# Patient Record
Sex: MALE | Race: ASIAN | Employment: OTHER | URBAN - METROPOLITAN AREA
[De-identification: names, ages, dates, MRNs, and addresses within clinical notes are randomized per-mention and may not be internally consistent; named-entity substitution may affect disease eponyms.]

---

## 2021-02-19 ENCOUNTER — OFFICE VISIT (OUTPATIENT)
Dept: ORTHOPEDICS CLINIC | Facility: CLINIC | Age: 81
End: 2021-02-19
Payer: MEDICARE

## 2021-02-19 DIAGNOSIS — M19.041 PRIMARY OSTEOARTHRITIS OF RIGHT HAND: Primary | ICD-10-CM

## 2021-02-19 PROCEDURE — 20600 DRAIN/INJ JOINT/BURSA W/O US: CPT | Performed by: ORTHOPAEDIC SURGERY

## 2021-02-19 PROCEDURE — 99212 OFFICE O/P EST SF 10 MIN: CPT | Performed by: ORTHOPAEDIC SURGERY

## 2021-02-19 RX ORDER — TRIAMCINOLONE ACETONIDE 40 MG/ML
20 INJECTION, SUSPENSION INTRA-ARTICULAR; INTRAMUSCULAR ONCE
Status: COMPLETED | OUTPATIENT
Start: 2021-02-19 | End: 2021-02-19

## 2021-02-19 RX ORDER — TRIAMCINOLONE ACETONIDE 40 MG/ML
40 INJECTION, SUSPENSION INTRA-ARTICULAR; INTRAMUSCULAR ONCE
Status: DISCONTINUED | OUTPATIENT
Start: 2021-02-19 | End: 2021-02-19

## 2021-02-19 RX ADMIN — TRIAMCINOLONE ACETONIDE 20 MG: 40 INJECTION, SUSPENSION INTRA-ARTICULAR; INTRAMUSCULAR at 13:26:00

## 2021-02-19 RX ADMIN — TRIAMCINOLONE ACETONIDE 20 MG: 40 INJECTION, SUSPENSION INTRA-ARTICULAR; INTRAMUSCULAR at 13:27:00

## 2021-02-19 NOTE — PROGRESS NOTES
Patient is a 14-year-old [place] male here for follow-up on his right thumb. 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   We have done cortisone injections in the past which have been hel

## 2021-07-09 ENCOUNTER — TELEPHONE (OUTPATIENT)
Dept: ORTHOPEDICS CLINIC | Facility: CLINIC | Age: 81
End: 2021-07-09

## 2021-07-09 DIAGNOSIS — M79.674 PAIN OF TOE OF RIGHT FOOT: Primary | ICD-10-CM

## 2021-07-09 NOTE — TELEPHONE ENCOUNTER
Panfilo Crespo., DPM  You 2 minutes ago (2:04 PM)     Ice, heat, ice (20 mins each)   Voltaren gel otc or other otc topicals like bengay, biofreeze ect     Attempted to call Priscilla Cohen regarding the recommendations provided by Dr. Elly Rjoas for pain re

## 2021-07-09 NOTE — TELEPHONE ENCOUNTER
Patient has had pain in his right great toe for the last two weeks. He is having difficulty walking and it is hard to get on a shoe. He would like to be seen today if possible.

## 2021-07-09 NOTE — TELEPHONE ENCOUNTER
Appt set and date/time/location confirmed with patient. Pt asking if there is anything OTC to use for his toe which is achy, pt notified Dr. Karrie Reeder would be asked for her recommendations. Pt verbalized understanding. No further questions at this time.

## 2021-07-09 NOTE — TELEPHONE ENCOUNTER
Attempted to call [facility] regarding scheduling appt next Friday with Dr. Ramesh Sinclair. Reached voicemail, left voicemail and provided a detail message with my call back contact information.

## 2021-07-16 ENCOUNTER — OFFICE VISIT (OUTPATIENT)
Dept: ORTHOPEDICS CLINIC | Facility: CLINIC | Age: 81
End: 2021-07-16
Payer: MEDICARE

## 2021-07-16 ENCOUNTER — HOSPITAL ENCOUNTER (OUTPATIENT)
Dept: GENERAL RADIOLOGY | Facility: HOSPITAL | Age: 81
Discharge: HOME | End: 2021-07-16
Attending: PODIATRIST
Payer: MEDICARE

## 2021-07-16 DIAGNOSIS — M79.674 PAIN OF TOE OF RIGHT FOOT: ICD-10-CM

## 2021-07-16 DIAGNOSIS — L03.031 PARONYCHIA OF TOE OF RIGHT FOOT: Primary | ICD-10-CM

## 2021-07-16 DIAGNOSIS — B35.1 ONYCHOMYCOSIS: ICD-10-CM

## 2021-07-16 PROCEDURE — 99213 OFFICE O/P EST LOW 20 MIN: CPT | Performed by: PODIATRIST

## 2021-07-16 PROCEDURE — 73660 X-RAY EXAM OF TOE(S): CPT | Performed by: PODIATRIST

## 2021-07-16 PROCEDURE — 10060 I&D ABSCESS SIMPLE/SINGLE: CPT | Performed by: PODIATRIST

## 2021-07-16 NOTE — PROGRESS NOTES
[facility] Progress Note    Subjective: Mr. Rich Sim iss a patient I have seen off-and-on for several years for his right foot and specifically his right great toe. He comes in today for follow-up and does obtain a new x-ray today.   Still has had a

## 2021-09-21 ENCOUNTER — OFFICE VISIT (OUTPATIENT)
Dept: ORTHOPEDICS CLINIC | Facility: CLINIC | Age: 81
End: 2021-09-21
Payer: MEDICARE

## 2021-09-21 VITALS — WEIGHT: 138 LBS | BODY MASS INDEX: 23.56 KG/M2 | HEIGHT: 64 IN

## 2021-09-21 DIAGNOSIS — M19.041 OSTEOARTHRITIS OF FINGER OF RIGHT HAND: Primary | ICD-10-CM

## 2021-09-21 PROCEDURE — 99212 OFFICE O/P EST SF 10 MIN: CPT | Performed by: ORTHOPAEDIC SURGERY

## 2021-09-21 RX ORDER — TRIAMCINOLONE ACETONIDE 40 MG/ML
20 INJECTION, SUSPENSION INTRA-ARTICULAR; INTRAMUSCULAR ONCE
Status: COMPLETED | OUTPATIENT
Start: 2021-09-21 | End: 2021-09-21

## 2021-09-21 RX ADMIN — TRIAMCINOLONE ACETONIDE 20 MG: 40 INJECTION, SUSPENSION INTRA-ARTICULAR; INTRAMUSCULAR at 09:47:00

## 2021-09-21 NOTE — PROGRESS NOTES
Patient is an 26-year-old male here for follow-up of his right thumb. I have done injections in the past for his thumb. Patient is having recurrence of his pain particularly of the MCP joint of the right thumb.   Have injected his IP joint as well in the

## 2021-12-27 ENCOUNTER — TELEPHONE (OUTPATIENT)
Dept: ORTHOPEDICS CLINIC | Facility: CLINIC | Age: 81
End: 2021-12-27

## 2021-12-27 DIAGNOSIS — M79.675 TOE PAIN, LEFT: Primary | ICD-10-CM

## 2021-12-27 NOTE — TELEPHONE ENCOUNTER
Patient is scheduled with Dr. Aftab Glaser for left pinky toe pain. Please advise if imaging is needed.

## 2021-12-27 NOTE — TELEPHONE ENCOUNTER
Verified voicemail reached with verbal consent signed. Attempted to call Priscilla Cohen regarding xray orders. Reached voicemail, left voicemail and provided a detail message with my call back contact information.

## 2021-12-28 ENCOUNTER — OFFICE VISIT (OUTPATIENT)
Dept: ORTHOPEDICS CLINIC | Facility: CLINIC | Age: 81
End: 2021-12-28
Payer: MEDICARE

## 2021-12-28 VITALS — WEIGHT: 138 LBS | HEIGHT: 64 IN | BODY MASS INDEX: 23.56 KG/M2

## 2021-12-28 DIAGNOSIS — M20.42 HAMMER TOE OF LEFT FOOT: Primary | ICD-10-CM

## 2021-12-28 DIAGNOSIS — L85.9 HYPERKERATOSIS: ICD-10-CM

## 2021-12-28 PROCEDURE — 99213 OFFICE O/P EST LOW 20 MIN: CPT | Performed by: PODIATRIST

## 2021-12-28 NOTE — PROGRESS NOTES
[facility] Progress Note    Subjective:   Pt is here for follow up of feet specifically for his left foot fifth toe, corn is developed over the last several weeks. He is not sure why and he thinks he may be developing more hammertoes.   I have see

## 2022-01-18 PROBLEM — M48.061 LUMBAR FORAMINAL STENOSIS: Status: ACTIVE | Noted: 2022-01-18

## 2022-02-15 ENCOUNTER — TELEPHONE (OUTPATIENT)
Dept: ORTHOPEDICS CLINIC | Facility: CLINIC | Age: 82
End: 2022-02-15

## 2022-02-15 NOTE — TELEPHONE ENCOUNTER
Reviewed patients chart, xray orders are required. Order placed for left hip xrays.  Please contact patient advise to arrive 30 mins prior to patients appt to complete x-ray order and schedule patients xray appt-Thank you  Future Appointments   Date Time Provider Justino Griselda   3/23/2022  2:20 PM Amrita Sanchez MD [facility]  [facility] [address] [place] [address] Drive

## 2022-02-23 ENCOUNTER — OFFICE VISIT (OUTPATIENT)
Dept: ORTHOPEDICS CLINIC | Facility: CLINIC | Age: 82
End: 2022-02-23
Payer: MEDICARE

## 2022-02-23 VITALS — HEIGHT: 63 IN | BODY MASS INDEX: 25.16 KG/M2 | WEIGHT: 142 LBS

## 2022-02-23 DIAGNOSIS — M19.041 OSTEOARTHRITIS OF FINGER OF RIGHT HAND: Primary | ICD-10-CM

## 2022-02-23 PROCEDURE — 99212 OFFICE O/P EST SF 10 MIN: CPT | Performed by: ORTHOPAEDIC SURGERY

## 2022-02-23 PROCEDURE — 20600 DRAIN/INJ JOINT/BURSA W/O US: CPT | Performed by: ORTHOPAEDIC SURGERY

## 2022-02-27 RX ORDER — TRIAMCINOLONE ACETONIDE 40 MG/ML
20 INJECTION, SUSPENSION INTRA-ARTICULAR; INTRAMUSCULAR ONCE
Status: SHIPPED | OUTPATIENT
Start: 2022-02-27

## 2022-02-28 NOTE — PROGRESS NOTES
Patient is an 79-year-old [place] male here for follow-up. Patient has had injections of his right thumb in the past for degenerative arthritis. Patient states the pain has improved with previous injections but he is having recurrence. Patient was last seen about 4 to 5 months ago. Patient's exam shows have tenderness and swelling at the MCP joint of the right thumb. Lacks about 20 degrees of extension and flexions about 45 degrees. Mild crepitation of the MCP joint is present. 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 Recommendation is to go ahead with an injection of the MCP joint of the right thumb which was done through a dorsal radial approach with 1/2 cc bupivacaine and 20 mg of Kenalog. Patient tolerated the injection well. Patient was advised to follow-up with me as needed.

## 2022-03-23 ENCOUNTER — HOSPITAL ENCOUNTER (OUTPATIENT)
Dept: GENERAL RADIOLOGY | Age: 82
Discharge: HOME | End: 2022-03-23
Attending: ORTHOPAEDIC SURGERY
Payer: MEDICARE

## 2022-03-23 ENCOUNTER — OFFICE VISIT (OUTPATIENT)
Dept: ORTHOPEDICS CLINIC | Facility: CLINIC | Age: 82
End: 2022-03-23
Payer: MEDICARE

## 2022-03-23 VITALS — BODY MASS INDEX: 25.16 KG/M2 | HEIGHT: 63 IN | WEIGHT: 142 LBS

## 2022-03-23 DIAGNOSIS — M25.552 LEFT HIP PAIN: ICD-10-CM

## 2022-03-23 DIAGNOSIS — M51.36 DEGENERATIVE LUMBAR DISC: ICD-10-CM

## 2022-03-23 DIAGNOSIS — M51.36 DEGENERATIVE LUMBAR DISC: Primary | ICD-10-CM

## 2022-03-23 PROCEDURE — 72110 X-RAY EXAM L-2 SPINE 4/>VWS: CPT | Performed by: ORTHOPAEDIC SURGERY

## 2022-03-23 PROCEDURE — 99213 OFFICE O/P EST LOW 20 MIN: CPT | Performed by: ORTHOPAEDIC SURGERY

## 2022-03-23 PROCEDURE — 73502 X-RAY EXAM HIP UNI 2-3 VIEWS: CPT | Performed by: ORTHOPAEDIC SURGERY

## 2022-03-23 NOTE — PROGRESS NOTES
Patient is an 79-year-old [place] male here for evaluation of his left hip. Patient is complaining of pain on a daily basis. He has had some injections by one of the pain clinic doctors with limited benefit. He does not have any x-rays today of the LS spine as of yet but he did have x-rays of his hip joints. Patient states that some of the pain goes down to the legs predominantly the left leg. Patient denies groin pain. Patient denies any recent injuries. I have seen the patient for his fingers in the past and those are doing fine. Patient's exam shows have tenderness in the left gluteal region. Some mild tenderness around the lower lumbar region. No pain with passive range of motion of the hips either flexion extension or internal and external rotation. Motor exam is grossly intact lower extremities to manual muscle testing. Light touch is intact lower extremities. X-rays of the lumbar spine shows him to have rather severe degenerative disc disease. Degenerative scoliosis is noted. Impression is that of degenerative disc disease of the lumbar spine. Recommendations are for him to go ahead and see physical therapy. That is not effective then an MRI scan of his lumbar spine would be appropriate. Patient also might benefit from an epidural steroid injection if physical therapy is not effective.

## 2022-04-05 ENCOUNTER — TELEPHONE (OUTPATIENT)
Dept: ORTHOPEDICS CLINIC | Facility: CLINIC | Age: 82
End: 2022-04-05

## 2022-04-06 NOTE — TELEPHONE ENCOUNTER
Left 2nd msg for pt to call back to clarify MRI order request.    Which body part? Worsening symptoms?   Last OV 03/23/22

## 2022-04-07 NOTE — TELEPHONE ENCOUNTER
Spoke with pt. Pt states Dr Saran Veras wanted him to get MRI of lumbar/hip. Pt already has scheduled at [facility] for May 6th 1pm, needs order sent to [facility]. Dr Saran Veras, can you confirm which MRI you want ordered and if you want to order directly through [facility] or have us order here and fax order to [facility]? Thanks! Pt wanted it noted he will [place] 13[place]April 25.

## 2022-04-10 ENCOUNTER — TELEPHONE (OUTPATIENT)
Dept: ORTHOPEDICS CLINIC | Facility: CLINIC | Age: 82
End: 2022-04-10

## 2022-04-11 ENCOUNTER — PATIENT MESSAGE (OUTPATIENT)
Dept: ORTHOPEDICS CLINIC | Facility: CLINIC | Age: 82
End: 2022-04-11

## 2022-06-16 ENCOUNTER — TELEPHONE (OUTPATIENT)
Dept: ORTHOPEDICS CLINIC | Facility: CLINIC | Age: 82
End: 2022-06-16

## 2022-06-16 DIAGNOSIS — M79.672 LEFT FOOT PAIN: Primary | ICD-10-CM

## 2022-06-22 ENCOUNTER — HOSPITAL ENCOUNTER (OUTPATIENT)
Dept: GENERAL RADIOLOGY | Age: 82
Discharge: HOME | End: 2022-06-22
Attending: PODIATRIST
Payer: MEDICARE

## 2022-06-22 ENCOUNTER — OFFICE VISIT (OUTPATIENT)
Dept: ORTHOPEDICS CLINIC | Facility: CLINIC | Age: 82
End: 2022-06-22
Payer: MEDICARE

## 2022-06-22 VITALS — BODY MASS INDEX: 23.9 KG/M2 | HEIGHT: 64 IN | WEIGHT: 140 LBS

## 2022-06-22 DIAGNOSIS — S90.222A SUBUNGUAL HEMATOMA OF TOE OF LEFT FOOT, INITIAL ENCOUNTER: Primary | ICD-10-CM

## 2022-06-22 DIAGNOSIS — M79.672 LEFT FOOT PAIN: ICD-10-CM

## 2022-06-22 PROCEDURE — 99213 OFFICE O/P EST LOW 20 MIN: CPT | Performed by: PODIATRIST

## 2022-06-22 NOTE — PROGRESS NOTES
[facility] Progress [facility]    Subjective:     Ousmane Nelson returns for follow-up of his feet and a new issue with his left great toe and he does a lot of  walking and hiking and is not aware of a specific incident but he started to have some discoloration and bleeding under the nail several days ago. He has left the nail alone and did have some pain but overall it is feeling much better at this point. Objective:     Exam left great toe the nail is loose proximally with tenting and lifting from the nail bed. Underlying abscess and fluid but no signs of infection neurovascular status intact                  Assessment/Plan:     Diagnoses and all orders for this visit:    Subungual hematoma of toe of left foot, initial encounter        With sterile prep the area was drained of fluid and the nail was left intact for protection. Application of bacitracin and Band-Aid. No soaks but protection of the toe daily with Neosporin and a Band-Aid. It should start to fall off on its own atraumatically. If it anytime it gets more painful draining or is not progressing he can come in and we can remove the nail under local.            Horacio Damon. Rosalva Hdz DPM  [facility]    [employer] speech recognition software was used to prepare this note. If a word or phrase is confusing, it is likely do to a failure of recognition. Please contact me with any questions or clarifications.

## 2022-07-20 ENCOUNTER — OFFICE VISIT (OUTPATIENT)
Dept: ORTHOPEDICS CLINIC | Facility: CLINIC | Age: 82
End: 2022-07-20
Payer: MEDICARE

## 2022-07-20 ENCOUNTER — HOSPITAL ENCOUNTER (OUTPATIENT)
Dept: GENERAL RADIOLOGY | Age: 82
End: 2022-07-20
Attending: PODIATRIST
Payer: MEDICARE

## 2022-07-20 ENCOUNTER — HOSPITAL ENCOUNTER (OUTPATIENT)
Dept: GENERAL RADIOLOGY | Age: 82
Discharge: HOME | End: 2022-07-20
Attending: PODIATRIST
Payer: MEDICARE

## 2022-07-20 VITALS — HEIGHT: 64 IN | BODY MASS INDEX: 23.9 KG/M2 | WEIGHT: 140 LBS

## 2022-07-20 DIAGNOSIS — S90.222D SUBUNGUAL HEMATOMA OF TOE OF LEFT FOOT, SUBSEQUENT ENCOUNTER: Primary | ICD-10-CM

## 2022-07-20 DIAGNOSIS — M79.675 TOE PAIN, LEFT: ICD-10-CM

## 2022-07-20 PROCEDURE — 99213 OFFICE O/P EST LOW 20 MIN: CPT | Performed by: PODIATRIST

## 2022-07-20 PROCEDURE — 73660 X-RAY EXAM OF TOE(S): CPT | Performed by: PODIATRIST

## 2022-07-20 PROCEDURE — 73630 X-RAY EXAM OF FOOT: CPT | Performed by: PODIATRIST

## 2022-07-20 NOTE — PROGRESS NOTES
[facility] Progress [facility]    Subjective:     Returns for follow-up of his left great toe loose nail. He had a subungual hematoma with some blistering and at the last visit I had drained it to give him some relief. It has loosened over the weeks and he feels that it is almost off. It is not painful        Objective:     Left great toe nail is about 80% loose no signs of infection no pain nailbed is clean and dry                  Assessment/Plan:     Diagnoses and all orders for this visit:    Subungual hematoma of toe of left foot, subsequent encounter        I would anticipate that over the next 2 to 3 days he will lose that nail. I taught him how to loosen it over so gently several times a day and it will simply fall off atraumatically. He does not need any antibiotic and it is not infected. Follow-up as needed. Cupples nails shorter and filed down            Jose E. Win Tsang DPM  [facility]    [employer] speech recognition software was used to prepare this note. If a word or phrase is confusing, it is likely do to a failure of recognition. Please contact me with any questions or clarifications.

## 2022-08-17 ENCOUNTER — OFFICE VISIT (OUTPATIENT)
Dept: ORTHOPEDICS CLINIC | Facility: CLINIC | Age: 82
End: 2022-08-17
Payer: MEDICARE

## 2022-08-17 DIAGNOSIS — M19.041 PRIMARY OSTEOARTHRITIS OF RIGHT HAND: ICD-10-CM

## 2022-08-17 DIAGNOSIS — M67.449 DIGITAL MUCINOUS CYST: ICD-10-CM

## 2022-08-17 DIAGNOSIS — M19.041 OSTEOARTHRITIS OF FINGER OF RIGHT HAND: Primary | ICD-10-CM

## 2022-08-17 DIAGNOSIS — M67.449 GANGLION CYST OF JOINT OF FINGER: ICD-10-CM

## 2022-08-17 PROCEDURE — 99212 OFFICE O/P EST SF 10 MIN: CPT | Performed by: ORTHOPAEDIC SURGERY

## 2022-08-17 PROCEDURE — 20600 DRAIN/INJ JOINT/BURSA W/O US: CPT | Performed by: ORTHOPAEDIC SURGERY

## 2022-08-17 RX ORDER — TRIAMCINOLONE ACETONIDE 40 MG/ML
20 INJECTION, SUSPENSION INTRA-ARTICULAR; INTRAMUSCULAR ONCE
Status: COMPLETED | OUTPATIENT
Start: 2022-08-17 | End: 2022-08-17

## 2022-08-17 RX ADMIN — TRIAMCINOLONE ACETONIDE 20 MG: 40 INJECTION, SUSPENSION INTRA-ARTICULAR; INTRAMUSCULAR at 10:00:00

## 2022-08-17 RX ADMIN — TRIAMCINOLONE ACETONIDE 20 MG: 40 INJECTION, SUSPENSION INTRA-ARTICULAR; INTRAMUSCULAR at 09:59:00

## 2022-08-17 NOTE — PROGRESS NOTES
Patient is a 50-year-old [place] male here for follow-up on his right hand. Patient has had prior injections of the MCP joint and the IP joint of the thumb. Patient is here for consideration of repeat injections. 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 Previous injections have been helpful. Patient's exam shows him to have tenderness and swelling over the IP joint of the thumb of the right hand on the radial aspect. Also tenderness and prominence of the MCP joint of the right thumb dorsally and radially. Patient has about 60 degrees of flexion of the IP joint and around 40 degrees of flexion of the MCP joint. Sensations intact to light touch all fingers. Impression is that of degenerative arthritis of the MCP joint and IP joint of the right thumb. 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 Third impression is that of a mucinous cyst or ganglion cyst of the right thumb MCP joint. Recommendation is to go ahead with cortisone injections which were done through a dorsal radial approach both into the IP joint and the MCP joint under sterile technique with 1/2 cc of bupivacaine 0.5% and 20 mg of Kenalog. Patient tolerated the injections well. Patient will follow-up with me as needed.

## 2023-04-11 ENCOUNTER — TELEPHONE (OUTPATIENT)
Dept: ORTHOPEDICS CLINIC | Facility: CLINIC | Age: 83
End: 2023-04-11

## 2023-04-11 DIAGNOSIS — M79.674 TOE PAIN, RIGHT: Primary | ICD-10-CM

## 2023-04-11 NOTE — TELEPHONE ENCOUNTER
Est. Right Ft Great Toe Pain on the bottom of toe. Please advise if imaging is needed.   Future Appointments   Date Time Provider Justino Villalobos   4/21/2023  9:00 AM Misty Palomino., DPM [facility]

## 2023-04-21 ENCOUNTER — OFFICE VISIT (OUTPATIENT)
Dept: ORTHOPEDICS CLINIC | Facility: CLINIC | Age: 83
End: 2023-04-21
Payer: MEDICARE

## 2023-04-21 VITALS — BODY MASS INDEX: 23.9 KG/M2 | HEIGHT: 64 IN | WEIGHT: 140 LBS

## 2023-04-21 DIAGNOSIS — L03.032 PARONYCHIA OF TOE OF LEFT FOOT: ICD-10-CM

## 2023-04-21 DIAGNOSIS — S90.222A SUBUNGUAL HEMATOMA OF TOE OF LEFT FOOT, INITIAL ENCOUNTER: Primary | ICD-10-CM

## 2023-04-21 PROCEDURE — 99213 OFFICE O/P EST LOW 20 MIN: CPT | Performed by: PODIATRIST

## 2023-04-21 RX ORDER — CEPHALEXIN 500 MG/1
500 CAPSULE ORAL 2 TIMES DAILY
Qty: 10 CAPSULE | Refills: 0 | Status: SHIPPED | OUTPATIENT
Start: 2023-04-21 | End: 2023-04-26

## 2023-04-21 NOTE — PROGRESS NOTES
[facility] Progress Note    Subjective:     Mr Jena Ng is here today with another issue involving his left great toe. He has had problems with this nail in the past and we had discussed bunion surgery to realign the toe but he wants to hold off on that. He has recurrent issues with the left great toenail. He is not sure what he did but several days ago, about 4 days ago the toe really hurt and he noticed blood under the nail. He started putting triple antibiotic on the nail yesterday and it has become much less tender. Objective:     Exam left great toenail mild lifting with subungual bleeding mild redness around the periphery but pretty minimal.  The nail is avulsing itself. No active drainage, no bleeding. Assessment/Plan:     Diagnoses and all orders for this visit:    Subungual hematoma of toe of left foot, initial encounter    Paronychia of toe of left foot    Other orders  -     cephalexin 500 MG Oral Cap; Take 1 capsule (500 mg total) by mouth 2 (two) times daily for 5 days. The nail is going to come off on its own with a little protection. At some point we may need to help it along and he can follow-up in 3 to 4 weeks if needed. Otherwise continue with the triple antibiotic and a Band-Aid, roomy shoes and go ahead on his trip tomorrow. I did give him an Rx for Keflex in case it starts to get more irritated draining red and painful. Then he can start taking the Keflex and follow-up after his trip. We talked about sport type sandal around the house to support his arches and allow more air. Adrien Alpers. Kelsie Nelson DPM  [facility]    [facility] speech recognition software was used to prepare this note. If a word or phrase is confusing, it is likely do to a failure of recognition. Please contact me with any questions or clarifications.

## 2023-05-05 ENCOUNTER — OFFICE VISIT (OUTPATIENT)
Dept: ORTHOPEDICS CLINIC | Facility: CLINIC | Age: 83
End: 2023-05-05
Payer: MEDICARE

## 2023-05-05 VITALS — HEIGHT: 64 IN | WEIGHT: 140 LBS | BODY MASS INDEX: 23.9 KG/M2

## 2023-05-05 DIAGNOSIS — S90.222D SUBUNGUAL HEMATOMA OF TOE OF LEFT FOOT, SUBSEQUENT ENCOUNTER: Primary | ICD-10-CM

## 2023-05-05 PROCEDURE — 99213 OFFICE O/P EST LOW 20 MIN: CPT | Performed by: PODIATRIST

## 2023-08-07 ENCOUNTER — TELEPHONE (OUTPATIENT)
Dept: ORTHOPEDICS CLINIC | Facility: CLINIC | Age: 83
End: 2023-08-07

## 2023-08-07 NOTE — TELEPHONE ENCOUNTER
Patient called to request an appt for RT thumb cortisone inj, patient last inj was 8/17/22, offered next available w/ both Dr. Anna Marie Hernandes and Chas Moya but patient states he will be out of town for a while and would like to request if he can be seen at [facility] anytime before 8/17/23. Please advise.

## 2023-08-08 NOTE — TELEPHONE ENCOUNTER
Future Appointments   Date Time Provider Justino Griselda   8/9/2023  9:00 AM Misti Worthington MD [facility]     Injection appointment no x rays needed.

## 2023-08-09 ENCOUNTER — OFFICE VISIT (OUTPATIENT)
Dept: ORTHOPEDICS CLINIC | Facility: CLINIC | Age: 83
End: 2023-08-09
Payer: MEDICARE

## 2023-08-09 VITALS — HEIGHT: 64 IN | BODY MASS INDEX: 23.9 KG/M2 | WEIGHT: 140 LBS

## 2023-08-09 DIAGNOSIS — M19.041 PRIMARY OSTEOARTHRITIS OF RIGHT HAND: Primary | ICD-10-CM

## 2023-08-09 RX ORDER — TRIAMCINOLONE ACETONIDE 40 MG/ML
20 INJECTION, SUSPENSION INTRA-ARTICULAR; INTRAMUSCULAR ONCE
Status: COMPLETED | OUTPATIENT
Start: 2023-08-09 | End: 2023-08-09

## 2023-08-09 RX ADMIN — TRIAMCINOLONE ACETONIDE 20 MG: 40 INJECTION, SUSPENSION INTRA-ARTICULAR; INTRAMUSCULAR at 13:30:00

## 2023-08-09 NOTE — PROGRESS NOTES
Patient is an 21-year-old [place] male here for follow-up on his right thumb. Patient has had a diagnosis of degenerative arthritis of his thumbs. He has had injections of both thumbs in the past.  Patient is here predominantly for the right thumb today. He states that the left thumb is doing fairly well. Patient's exam shows him to have tenderness of the MCP joint of the right thumb. Patient lacks 15 degrees of extension flexions to about 80 degrees. Mild crepitation with range of motion. Active extension and flexion present. Sensory exam intact light touch all fingers. Good cap refill all fingers. 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 Recommendation is to go ahead with a cortisone injection which was done under sterile technique with 1/2 cc of Xylocaine and half cc of Kenalog 20 mg through a dorsal radial approach. Patient tolerated the injection well. Patient will follow-up with me as needed. I did advise the patient that if he has ongoing or recurrent problems that he should see Dr. Mercy Richardson in our office or hand surgeon of his choice.

## 2023-10-06 ENCOUNTER — TELEPHONE (OUTPATIENT)
Dept: ORTHOPEDICS CLINIC | Facility: CLINIC | Age: 83
End: 2023-10-06

## 2023-10-06 NOTE — TELEPHONE ENCOUNTER
Message left to call office back. Need nature of concern   DOI if there was an injury    Hx.  Hematoma to left big toe 5/5/23

## 2023-10-06 NOTE — TELEPHONE ENCOUNTER
Patient called for left foot big toe and was insisting to be seen today, PSR told him that would not work out and he's still insisting. Patients main concern is what he should use as medication on his toes. Please advise     .

## 2023-10-09 NOTE — TELEPHONE ENCOUNTER
Type Contact Phone/Fax    10/06/2023 02:48 PM CDT Phone (Cas) [facility] (Self) [phone] (OSBALDO)   L

## 2023-10-10 NOTE — TELEPHONE ENCOUNTER
Called patient no answer at this time. Message: Please call office back if you need to get set up with an appointment.

## 2024-01-19 ENCOUNTER — TELEPHONE (OUTPATIENT)
Dept: ORTHOPEDICS CLINIC | Facility: CLINIC | Age: 84
End: 2024-01-19

## 2024-01-19 ENCOUNTER — OFFICE VISIT (OUTPATIENT)
Dept: ORTHOPEDICS CLINIC | Facility: CLINIC | Age: 84
End: 2024-01-19
Payer: MEDICARE

## 2024-01-19 ENCOUNTER — HOSPITAL ENCOUNTER (OUTPATIENT)
Dept: GENERAL RADIOLOGY | Age: 84
Discharge: HOME | End: 2024-01-19
Attending: PODIATRIST
Payer: MEDICARE

## 2024-01-19 VITALS — HEIGHT: 64 IN | BODY MASS INDEX: 23.9 KG/M2 | WEIGHT: 140 LBS

## 2024-01-19 DIAGNOSIS — M79.674 TOE PAIN, RIGHT: ICD-10-CM

## 2024-01-19 DIAGNOSIS — L60.0 INGROWN NAIL: Primary | ICD-10-CM

## 2024-01-19 DIAGNOSIS — M79.674 PAIN OF RIGHT GREAT TOE: Primary | ICD-10-CM

## 2024-01-19 DIAGNOSIS — M79.674 PAIN OF RIGHT GREAT TOE: ICD-10-CM

## 2024-01-19 PROCEDURE — 99213 OFFICE O/P EST LOW 20 MIN: CPT | Performed by: PODIATRIST

## 2024-01-19 PROCEDURE — 73660 X-RAY EXAM OF TOE(S): CPT | Performed by: PODIATRIST

## 2024-01-19 NOTE — PROGRESS NOTES
[facility] Progress Note    Subjective:     Bere is here with new pain involving right great toe  No injury        Objective:     Pain at distal medial right great toe - no infection  More nail related  Toe well perfused           Imaging: distal phalanx normal xray        Assessment/Plan:     Diagnoses and all orders for this visit:    Ingrown nail    Toe pain, right        He likely needs an ingrown nail procedure  Gave him names of where he can go for the procedure as I am not doing these anymore            MASSIMO HerreraM  [facility]    [employer] speech recognition software was used to prepare this note. If a word or phrase is confusing, it is likely do to a failure of recognition. Please contact me with any questions or clarifications.

## 2024-01-19 NOTE — TELEPHONE ENCOUNTER
XR ordered per ortho protocol. XR scheduled and patient was notified via visetohart to let them know that they should arrive 15-20 minutes early, in order for them to complete imaging.

## (undated) DIAGNOSIS — M25.552 LEFT HIP PAIN: Primary | ICD-10-CM

## (undated) DIAGNOSIS — M51.16 RADICULOPATHY DUE TO LUMBAR INTERVERTEBRAL DISC DISORDER: ICD-10-CM

## (undated) DIAGNOSIS — M51.36 DEGENERATIVE LUMBAR DISC: Primary | ICD-10-CM